# Patient Record
Sex: FEMALE | Race: WHITE
[De-identification: names, ages, dates, MRNs, and addresses within clinical notes are randomized per-mention and may not be internally consistent; named-entity substitution may affect disease eponyms.]

---

## 2017-04-27 ENCOUNTER — HOSPITAL ENCOUNTER (OUTPATIENT)
Dept: HOSPITAL 20 - CCU | Age: 77
Setting detail: OBSERVATION
LOS: 1 days | End: 2017-04-28
Admitting: INTERNAL MEDICINE
Payer: MEDICARE

## 2017-04-27 DIAGNOSIS — I12.9: ICD-10-CM

## 2017-04-27 DIAGNOSIS — Z96.641: ICD-10-CM

## 2017-04-27 DIAGNOSIS — Z98.890: ICD-10-CM

## 2017-04-27 DIAGNOSIS — Z89.422: ICD-10-CM

## 2017-04-27 DIAGNOSIS — Z90.89: ICD-10-CM

## 2017-04-27 DIAGNOSIS — Z79.01: ICD-10-CM

## 2017-04-27 DIAGNOSIS — Z88.5: ICD-10-CM

## 2017-04-27 DIAGNOSIS — K21.9: ICD-10-CM

## 2017-04-27 DIAGNOSIS — Z79.82: ICD-10-CM

## 2017-04-27 DIAGNOSIS — N18.9: ICD-10-CM

## 2017-04-27 DIAGNOSIS — R13.10: ICD-10-CM

## 2017-04-27 DIAGNOSIS — Z79.899: ICD-10-CM

## 2017-04-27 DIAGNOSIS — Z88.1: ICD-10-CM

## 2017-04-27 DIAGNOSIS — I25.10: ICD-10-CM

## 2017-04-27 DIAGNOSIS — E87.6: Primary | ICD-10-CM

## 2017-04-27 DIAGNOSIS — I73.00: ICD-10-CM

## 2017-04-27 DIAGNOSIS — Z89.512: ICD-10-CM

## 2017-04-27 DIAGNOSIS — R19.7: ICD-10-CM

## 2017-04-27 DIAGNOSIS — M34.1: ICD-10-CM

## 2017-04-27 DIAGNOSIS — Z91.048: ICD-10-CM

## 2017-04-27 DIAGNOSIS — N17.9: ICD-10-CM

## 2017-04-27 LAB
INR: 1.1 INR (ref 0.9–1.1)
PROTHROMBIN TIME: 12.4 SECONDS (ref 9–13.6)

## 2017-04-27 PROCEDURE — G0379 DIRECT REFER HOSPITAL OBSERV: HCPCS

## 2017-04-27 PROCEDURE — G0378 HOSPITAL OBSERVATION PER HR: HCPCS

## 2017-04-28 LAB
ALB/GLOB RATIO: 1 (ref 0.8–2)
ALBUMIN: 2.4 G/DL (ref 3.5–5)
ALKALINE PHOSPHATASE: 55 U/L (ref 33–138)
ALT/SGPT: 15 U/L (ref 12–78)
ANION GAP: 11 MMOL/L (ref 0–20)
AST/SGOT: 17 U/L (ref 10–40)
BASO %: 0.3 % (ref 0–2)
BASO ABSOLUTE COUNT: 0 THO/CMM (ref 0–0.2)
BILIRUBIN,TOTAL: 0.2 MG/DL (ref 0–1.5)
BLOOD UREA NITROGEN: 21 MG/DL (ref 6–24)
BUN/CREATININE RATIO: 16 RATIO (ref 6–25)
CALCIUM: 7.5 MG/DL (ref 8.5–10.5)
CARBON DIOXIDE-VENOUS: 23 MMOL/L (ref 22–32)
CHLORIDE: 114 MMOL/L (ref 96–110)
CREATININE: 1.32 MG/DL (ref 0.5–1.1)
EGFR VALUE FOR BLACK: 45 ML/MIN
EOS %: 0.7 % (ref 0–7)
EOSINOPHIL ABSOLUTE COUNT: 0.1 THO/CMM (ref 0–0.7)
GLOBULIN: 2.3 GM/DL (ref 2–4.4)
GLUCOSE: 110 MG/DL (ref 70–110)
HCT-HEMATOCRIT: 28.9 % (ref 34–49)
HGB-HEMOGLOBIN: 9.4 GM/DL (ref 12–15.5)
IMMATURE GRANULOCYTES ABSOLUTE: 0.01 THO/CMM (ref 0–0.03)
IMMATURE GRANULOCYTES PERCENT: 0.1 % (ref 0–0.3)
LYMPH %: 17.5 % (ref 20–45)
LYMPH ABSOLUTE COUNT: 1.2 THO/CMM (ref 0.8–4.5)
MAGNESIUM: 1.5 MG/DL (ref 1.8–2.6)
MCH (MEAN CORPUSCULAR HGB): 29.6 PG (ref 28–32)
MCHC MEAN CORPUSCULAR HGB CONC: 32.5 % (ref 32–36)
MCV (MEAN CELL VOLUME): 90.9 FL (ref 82–96)
MEAN PLATELET VOLUME: 10.3 CMC (ref 9.4–12.4)
MONO %: 9.8 % (ref 0–12)
MONOCYTE ABSOLUTE COUNT: 0.7 THO/CMM (ref 0–1.2)
NEUTROPHIL ABSOLUTE COUNT: 5 THO/CMM (ref 1.6–8)
NEUTROPHIL-AUTOMATED: 5 THO/CMM (ref 1.6–8)
NEUTROPHILS %: 71.6 % (ref 40–80)
NUCLEATED RED BLOOD CELL %: 0 % (ref 0–0.09)
PHOSPHOROUS: 1.5 MG/DL (ref 2.5–4.9)
PLATELET COUNT: 116 THO/CMM (ref 150–450)
POTASSIUM: 3.4 MMOL/L (ref 3.7–5.1)
RED BLOOD COUNT: 3.18 MIL/CMM (ref 4–5.2)
RED CELL DISTRIBUTION WIDTH: 14.1 % (ref 12.4–16.4)
SODIUM: 145 MMOL/L (ref 135–145)
WHITE BLOOD COUNT: 6.9 THO/CMM (ref 4–10)

## 2017-05-25 ENCOUNTER — HOSPITAL ENCOUNTER (INPATIENT)
Dept: HOSPITAL 20 - SRG | Age: 77
LOS: 7 days | Discharge: HOME HEALTH | DRG: 264 | End: 2017-06-01
Admitting: INTERNAL MEDICINE
Payer: MEDICARE

## 2017-05-25 DIAGNOSIS — I50.32: ICD-10-CM

## 2017-05-25 DIAGNOSIS — M19.90: ICD-10-CM

## 2017-05-25 DIAGNOSIS — F32.9: ICD-10-CM

## 2017-05-25 DIAGNOSIS — I73.00: ICD-10-CM

## 2017-05-25 DIAGNOSIS — I25.10: ICD-10-CM

## 2017-05-25 DIAGNOSIS — E43: ICD-10-CM

## 2017-05-25 DIAGNOSIS — M34.9: ICD-10-CM

## 2017-05-25 DIAGNOSIS — L97.519: ICD-10-CM

## 2017-05-25 DIAGNOSIS — I48.2: ICD-10-CM

## 2017-05-25 DIAGNOSIS — M81.0: ICD-10-CM

## 2017-05-25 DIAGNOSIS — Z79.82: ICD-10-CM

## 2017-05-25 DIAGNOSIS — R00.0: ICD-10-CM

## 2017-05-25 DIAGNOSIS — N17.9: ICD-10-CM

## 2017-05-25 DIAGNOSIS — Z88.1: ICD-10-CM

## 2017-05-25 DIAGNOSIS — K22.4: ICD-10-CM

## 2017-05-25 DIAGNOSIS — N18.4: ICD-10-CM

## 2017-05-25 DIAGNOSIS — I34.0: ICD-10-CM

## 2017-05-25 DIAGNOSIS — I73.9: ICD-10-CM

## 2017-05-25 DIAGNOSIS — E78.5: ICD-10-CM

## 2017-05-25 DIAGNOSIS — R62.7: ICD-10-CM

## 2017-05-25 DIAGNOSIS — J96.01: ICD-10-CM

## 2017-05-25 DIAGNOSIS — I31.3: ICD-10-CM

## 2017-05-25 DIAGNOSIS — I13.0: Primary | ICD-10-CM

## 2017-05-25 DIAGNOSIS — I95.1: ICD-10-CM

## 2017-05-25 DIAGNOSIS — M34.1: ICD-10-CM

## 2017-05-25 DIAGNOSIS — M26.601: ICD-10-CM

## 2017-05-25 DIAGNOSIS — E86.9: ICD-10-CM

## 2017-05-25 DIAGNOSIS — G47.33: ICD-10-CM

## 2017-05-25 DIAGNOSIS — Z88.8: ICD-10-CM

## 2017-05-25 DIAGNOSIS — Z79.01: ICD-10-CM

## 2017-05-25 DIAGNOSIS — I27.2: ICD-10-CM

## 2017-05-25 DIAGNOSIS — D63.1: ICD-10-CM

## 2017-05-25 LAB
ANION GAP: 13 MMOL/L (ref 0–20)
BLOOD UREA NITROGEN: 32 MG/DL (ref 6–24)
BUN/CREATININE RATIO: 16 RATIO (ref 6–25)
CALCIUM: 7.7 MG/DL (ref 8.5–10.5)
CARBON DIOXIDE-VENOUS: 27 MMOL/L (ref 22–32)
CHLORIDE: 107 MMOL/L (ref 96–110)
CREATININE: 2 MG/DL (ref 0.5–1.1)
EGFR VALUE FOR BLACK: 27 ML/MIN
GLUCOSE: 96 MG/DL (ref 70–110)
INR: 1 INR (ref 0.9–1.1)
POTASSIUM: 4.6 MMOL/L (ref 3.7–5.1)
PROTHROMBIN TIME: 11.3 SECONDS (ref 9–13.6)
SODIUM: 142 MMOL/L (ref 135–145)

## 2017-05-25 PROCEDURE — 0DNT4ZZ: ICD-10-PCS | Performed by: SURGERY

## 2017-05-25 PROCEDURE — C1769 GUIDE WIRE: HCPCS

## 2017-05-25 PROCEDURE — 0D160ZA BYPASS STOMACH TO JEJUNUM, OPEN APPROACH: ICD-10-PCS | Performed by: SURGERY

## 2017-05-25 PROCEDURE — C1894 INTRO/SHEATH, NON-LASER: HCPCS

## 2017-05-25 PROCEDURE — C8924 2D TTE W OR W/O FOL W/CON,FU: HCPCS

## 2017-05-25 PROCEDURE — 0D1A0Z4 BYPASS JEJUNUM TO CUTANEOUS, OPEN APPROACH: ICD-10-PCS | Performed by: SURGERY

## 2017-05-25 NOTE — NUR
VIRTUAL CARE NOTE: PT. IN BED, STATES SHE HAS OCCASIONAL NAUSEA, ALTHOUGH
STATES SHE DOESN'T FEEL GOOD AND IS NEEDING SOME PAIN MEDS. WILL PAGE RN ON
UNIT AND ALLOW FOR REST PERIOD. INSTRUCTED TO PT. TO CALL FOR FUTURE NEEDS AND
NOT TO EAT OR DRINK UNTIL NAUSEA SUBSIDES. STATES VERBAL UNDERSTANDING. RN ON
UNIT PAGED.

## 2017-05-26 LAB
ANION GAP: 15 MMOL/L (ref 0–20)
BASO %: 0.1 % (ref 0–2)
BASO ABSOLUTE COUNT: 0 THO/CMM (ref 0–0.2)
BLOOD UREA NITROGEN: 31 MG/DL (ref 6–24)
BUN/CREATININE RATIO: 16 RATIO (ref 6–25)
CALCIUM: 7.5 MG/DL (ref 8.5–10.5)
CARBON DIOXIDE-VENOUS: 24 MMOL/L (ref 22–32)
CHLORIDE: 107 MMOL/L (ref 96–110)
CREATININE: 1.9 MG/DL (ref 0.5–1.1)
EGFR VALUE FOR BLACK: 29 ML/MIN
EOS %: 0 % (ref 0–7)
EOSINOPHIL ABSOLUTE COUNT: 0 THO/CMM (ref 0–0.7)
GLUCOSE: 160 MG/DL (ref 70–110)
HCT-HEMATOCRIT: 28.5 % (ref 34–49)
HGB-HEMOGLOBIN: 9.2 GM/DL (ref 12–15.5)
IMMATURE GRANULOCYTES ABSOLUTE: 0.03 THO/CMM (ref 0–0.03)
IMMATURE GRANULOCYTES PERCENT: 0.2 % (ref 0–0.3)
LYMPH %: 6.1 % (ref 20–45)
LYMPH ABSOLUTE COUNT: 0.8 THO/CMM (ref 0.8–4.5)
MAGNESIUM: 1.7 MG/DL (ref 1.8–2.6)
MCH (MEAN CORPUSCULAR HGB): 30.1 PG (ref 28–32)
MCHC MEAN CORPUSCULAR HGB CONC: 32.3 % (ref 32–36)
MCV (MEAN CELL VOLUME): 93.1 FL (ref 82–96)
MEAN PLATELET VOLUME: 10.5 CMC (ref 9.4–12.4)
MONO %: 4.2 % (ref 0–12)
MONOCYTE ABSOLUTE COUNT: 0.5 THO/CMM (ref 0–1.2)
NEUTROPHIL ABSOLUTE COUNT: 11.5 THO/CMM (ref 1.6–8)
NEUTROPHIL-AUTOMATED: 11.5 THO/CMM (ref 1.6–8)
NEUTROPHILS %: 89.4 % (ref 40–80)
NUCLEATED RED BLOOD CELL %: 0 % (ref 0–0.09)
PHOSPHOROUS: 3.7 MG/DL (ref 2.5–4.9)
PLATELET COUNT: 124 THO/CMM (ref 150–450)
POTASSIUM: 4.5 MMOL/L (ref 3.7–5.1)
RED BLOOD COUNT: 3.06 MIL/CMM (ref 4–5.2)
RED CELL DISTRIBUTION WIDTH: 14.6 % (ref 12.4–16.4)
SODIUM: 141 MMOL/L (ref 135–145)
WHITE BLOOD COUNT: 12.9 THO/CMM (ref 4–10)

## 2017-05-26 NOTE — NUR
VIRTUAL CARE NOTE: ASSESSMENT DEFERRED. PT EITHER WITH AIDE OR SLEEPING. WILL
CONTINUE WITH CHART REVIEW.

## 2017-05-26 NOTE — NUR
virtual care note: was going to call in to visit with pt but notice that she
is sleeping. will not disturb patient. will continue to monitor. electronic
chart reviewed.

## 2017-05-27 LAB
ANION GAP: 12 MMOL/L (ref 0–20)
BLOOD UREA NITROGEN: 28 MG/DL (ref 6–24)
BUN/CREATININE RATIO: 20 RATIO (ref 6–25)
CALCIUM: 7.3 MG/DL (ref 8.5–10.5)
CARBON DIOXIDE-VENOUS: 26 MMOL/L (ref 22–32)
CHLORIDE: 106 MMOL/L (ref 96–110)
CREATININE: 1.35 MG/DL (ref 0.5–1.1)
EGFR VALUE FOR BLACK: 43 ML/MIN
GLUCOSE: 196 MG/DL (ref 70–110)
MAGNESIUM: 1.6 MG/DL (ref 1.8–2.6)
PHOSPHOROUS: 1.6 MG/DL (ref 2.5–4.9)
POTASSIUM: 4 MMOL/L (ref 3.7–5.1)
SODIUM: 140 MMOL/L (ref 135–145)

## 2017-05-27 NOTE — NUR
VIRTUAL CARE NOTE: CHECKED IN ON PT AT THIS TIME. SHE IS RESTING IN BED.
STATES SHE HAS BEEN EXPERIENCING SOME NAUSEA AND HAS HAD A FEW LOOSE STOOLS
OVERNIGHT. REQUESTING IMMODIUM AND ZOFRAN--RELAYED THESE REQUESTS TO HER
BEDSIDE NURSE, MIN LOPEZ. PT STATES SHE IS SATISFIED WITH HER CARE. ALSO
STATES SHE HAS BEEN UP FOR A WALK TODAY, THOUGH SHE DOES FEEL VERY WEAK. WILL
CONTINUE TO MONITOR. ELECTRONIC CHART REVIEWED.

## 2017-05-28 LAB
ANION GAP: 11 MMOL/L (ref 0–20)
BLOOD UREA NITROGEN: 26 MG/DL (ref 6–24)
BUN/CREATININE RATIO: 21 RATIO (ref 6–25)
CALCIUM: 7.2 MG/DL (ref 8.5–10.5)
CARBON DIOXIDE-VENOUS: 25 MMOL/L (ref 22–32)
CHLORIDE: 106 MMOL/L (ref 96–110)
CREATININE: 1.16 MG/DL (ref 0.5–1.1)
EGFR VALUE FOR BLACK: 52 ML/MIN
GLUCOSE: 160 MG/DL (ref 70–110)
HCT-HEMATOCRIT: 27.9 % (ref 34–49)
HGB-HEMOGLOBIN: 8.8 GM/DL (ref 12–15.5)
INR: 0.9 INR (ref 0.9–1.1)
MCV (MEAN CELL VOLUME): 95.5 FL (ref 82–96)
POTASSIUM: 4.3 MMOL/L (ref 3.7–5.1)
PROTHROMBIN TIME: 10.9 SECONDS (ref 9–13.6)
RED CELL DISTRIBUTION WIDTH: 14.5 % (ref 12.4–16.4)
SODIUM: 138 MMOL/L (ref 135–145)

## 2017-05-28 NOTE — NUR
VN NOTE-PER BEDSIDE NURSE MIN HERMAN WAS ALSO NOTIFIED ON THE DDIMER AND
PROBNP RESULTS. DR CRAIN WAS ROUNDING AND SAW THE RESULTS AND ORDERS A CT
ANGIO.

## 2017-05-28 NOTE — NUR
VN/LEADER ROUNDING-VISITED WITH PATIENT AS SHE LAY IN BED-STATES PAIN IS STILL
THERE BUT CONTROLLED WITH PATITO' CARE(THE RN). PATIENT STATES THE STOOLS
HAVE SLOWED DOWN SOME WITH THE TF RATE BEING TURNED DOWN. PATIENT HAS NO
FURTHER QUESTIONS OR CONCERNS OTHER THAN BEING TIRED AND SLEEPY AT THIS TIME.

## 2017-05-29 LAB
ALB/GLOB RATIO: 0.8 (ref 0.8–2)
ALBUMIN: 1.8 G/DL (ref 3.5–5)
ALKALINE PHOSPHATASE: 49 U/L (ref 33–138)
ALT/SGPT: 9 U/L (ref 12–78)
ANION GAP: 10 MMOL/L (ref 0–20)
AST/SGOT: 12 U/L (ref 10–40)
BASO %: 0 % (ref 0–2)
BASO ABSOLUTE COUNT: 0 THO/CMM (ref 0–0.2)
BILIRUBIN,TOTAL: 0.3 MG/DL (ref 0–1.5)
BLOOD UREA NITROGEN: 28 MG/DL (ref 6–24)
BUN/CREATININE RATIO: 23 RATIO (ref 6–25)
CALCIUM: 7.4 MG/DL (ref 8.5–10.5)
CARBON DIOXIDE-VENOUS: 28 MMOL/L (ref 22–32)
CHLORIDE: 107 MMOL/L (ref 96–110)
CREATININE: 1.18 MG/DL (ref 0.5–1.1)
EGFR VALUE FOR BLACK: 51 ML/MIN
EOS %: 0.4 % (ref 0–7)
EOSINOPHIL ABSOLUTE COUNT: 0 THO/CMM (ref 0–0.7)
GLOBULIN: 2.1 GM/DL (ref 2–4.4)
GLUCOSE: 79 MG/DL (ref 70–110)
HCT-HEMATOCRIT: 24.1 % (ref 34–49)
HGB-HEMOGLOBIN: 7.6 GM/DL (ref 12–15.5)
IMMATURE GRANULOCYTES ABSOLUTE: 0.05 THO/CMM (ref 0–0.03)
IMMATURE GRANULOCYTES PERCENT: 1 % (ref 0–0.3)
INR: 1.1 INR (ref 0.9–1.1)
LYMPH %: 12.8 % (ref 20–45)
LYMPH ABSOLUTE COUNT: 0.7 THO/CMM (ref 0.8–4.5)
MAGNESIUM: 1.8 MG/DL (ref 1.8–2.6)
MCH (MEAN CORPUSCULAR HGB): 29.9 PG (ref 28–32)
MCHC MEAN CORPUSCULAR HGB CONC: 31.5 % (ref 32–36)
MCV (MEAN CELL VOLUME): 94.9 FL (ref 82–96)
MEAN PLATELET VOLUME: 10.1 CMC (ref 9.4–12.4)
MONO %: 8.8 % (ref 0–12)
MONOCYTE ABSOLUTE COUNT: 0.5 THO/CMM (ref 0–1.2)
NEUTROPHIL ABSOLUTE COUNT: 4 THO/CMM (ref 1.6–8)
NEUTROPHIL-AUTOMATED: 4 THO/CMM (ref 1.6–8)
NEUTROPHILS %: 77 % (ref 40–80)
NUCLEATED RED BLOOD CELL %: 0 % (ref 0–0.09)
PHOSPHOROUS: 2.7 MG/DL (ref 2.5–4.9)
PLATELET COUNT: 155 THO/CMM (ref 150–450)
POTASSIUM: 4.7 MMOL/L (ref 3.7–5.1)
PROTHROMBIN TIME: 12.3 SECONDS (ref 9–13.6)
RBC MORPHOLOGY: (no result)
RED BLOOD COUNT: 2.54 MIL/CMM (ref 4–5.2)
RED CELL DISTRIBUTION WIDTH: 14.7 % (ref 12.4–16.4)
SODIUM: 140 MMOL/L (ref 135–145)
WHITE BLOOD COUNT: 5.2 THO/CMM (ref 4–10)

## 2017-05-30 LAB
ANION GAP: 14 MMOL/L (ref 0–20)
BLOOD UREA NITROGEN: 30 MG/DL (ref 6–24)
BUN/CREATININE RATIO: 23 RATIO (ref 6–25)
CALCIUM: 7.4 MG/DL (ref 8.5–10.5)
CARBON DIOXIDE-VENOUS: 26 MMOL/L (ref 22–32)
CHLORIDE: 104 MMOL/L (ref 96–110)
CREATININE: 1.33 MG/DL (ref 0.5–1.1)
EGFR VALUE FOR BLACK: 44 ML/MIN
GLUCOSE: 69 MG/DL (ref 70–110)
INR: 1.1 INR (ref 0.9–1.1)
MAGNESIUM: 1.8 MG/DL (ref 1.8–2.6)
POTASSIUM: 4.2 MMOL/L (ref 3.7–5.1)
PROTHROMBIN TIME: 13.2 SECONDS (ref 9–13.6)
SODIUM: 140 MMOL/L (ref 135–145)

## 2017-05-30 NOTE — NUR
VIRTUAL CARE NOTE: PT RESTING ON BED, STATES DOING OK NOT EATING MUCH YET DUE
TO POOR APPETITE, THE PLAN IS TO RESTART TF TODAY, PT OK FOR DIETICIAN VISIT
REGARDING DIET CONCERNS. PLAN OF CARE AND DISCHARGE PLAN DISCUSSED, WILL DC
WITH C AND HOME INFUSION. PT DENIES ANY NEEDS AT THIS TIME.

## 2017-05-31 LAB
ANION GAP: 12 MMOL/L (ref 0–20)
BASO %: 0.2 % (ref 0–2)
BASO ABSOLUTE COUNT: 0 THO/CMM (ref 0–0.2)
BLOOD UREA NITROGEN: 25 MG/DL (ref 6–24)
BUN/CREATININE RATIO: 17 RATIO (ref 6–25)
CALCIUM: 7.4 MG/DL (ref 8.5–10.5)
CARBON DIOXIDE-VENOUS: 30 MMOL/L (ref 22–32)
CHLORIDE: 102 MMOL/L (ref 96–110)
CREATININE: 1.39 MG/DL (ref 0.5–1.1)
EGFR VALUE FOR BLACK: 42 ML/MIN
EOS %: 0.2 % (ref 0–7)
EOSINOPHIL ABSOLUTE COUNT: 0 THO/CMM (ref 0–0.7)
GLUCOSE: 118 MG/DL (ref 70–110)
HCT-HEMATOCRIT: 26.7 % (ref 34–49)
HGB-HEMOGLOBIN: 8.7 GM/DL (ref 12–15.5)
IMMATURE GRANULOCYTES ABSOLUTE: 0.08 THO/CMM (ref 0–0.03)
IMMATURE GRANULOCYTES PERCENT: 1.3 % (ref 0–0.3)
INR: 1.1 INR (ref 0.9–1.1)
LYMPH %: 14.9 % (ref 20–45)
LYMPH ABSOLUTE COUNT: 0.9 THO/CMM (ref 0.8–4.5)
MCH (MEAN CORPUSCULAR HGB): 30.2 PG (ref 28–32)
MCHC MEAN CORPUSCULAR HGB CONC: 32.6 % (ref 32–36)
MCV (MEAN CELL VOLUME): 92.7 FL (ref 82–96)
MEAN PLATELET VOLUME: 9.8 CMC (ref 9.4–12.4)
MONO %: 7.2 % (ref 0–12)
MONOCYTE ABSOLUTE COUNT: 0.5 THO/CMM (ref 0–1.2)
NEUTROPHIL ABSOLUTE COUNT: 4.8 THO/CMM (ref 1.6–8)
NEUTROPHIL-AUTOMATED: 4.8 THO/CMM (ref 1.6–8)
NEUTROPHILS %: 76.2 % (ref 40–80)
NUCLEATED RED BLOOD CELL %: 0 % (ref 0–0.09)
PLATELET COUNT: 174 THO/CMM (ref 150–450)
POTASSIUM: 3.8 MMOL/L (ref 3.7–5.1)
PROTHROMBIN TIME: 13.2 SECONDS (ref 9–13.6)
RED BLOOD COUNT: 2.88 MIL/CMM (ref 4–5.2)
RED CELL DISTRIBUTION WIDTH: 14.2 % (ref 12.4–16.4)
SODIUM: 140 MMOL/L (ref 135–145)
WHITE BLOOD COUNT: 6.3 THO/CMM (ref 4–10)

## 2017-06-01 LAB
ANION GAP: 10 MMOL/L (ref 0–20)
BLOOD UREA NITROGEN: 22 MG/DL (ref 6–24)
BUN/CREATININE RATIO: 15 RATIO (ref 6–25)
CALCIUM: 7.1 MG/DL (ref 8.5–10.5)
CARBON DIOXIDE-VENOUS: 31 MMOL/L (ref 22–32)
CHLORIDE: 101 MMOL/L (ref 96–110)
CREATININE: 1.39 MG/DL (ref 0.5–1.1)
EGFR VALUE FOR BLACK: 42 ML/MIN
GLUCOSE: 134 MG/DL (ref 70–110)
INR: 1.1 INR (ref 0.9–1.1)
POTASSIUM: 3.8 MMOL/L (ref 3.7–5.1)
PROTHROMBIN TIME: 12.6 SECONDS (ref 9–13.6)
SODIUM: 138 MMOL/L (ref 135–145)

## 2017-06-01 NOTE — NUR
VIRTUAL NURSE NOTE: PT CAN'T HEAR VN WELL, DISCHARGE TEACHING DONE AT BEDSIDE.
INFORMATION GIVEN TO PT, ALL QUESTONS ANSWERED. VN CALLED PHARMACY SPOKE TO
[facility]ILIP PHARMACIST TO GO THROUGH THE MED LIST, PT WANTS TO KNOW WHAT MED CAN BE
CRUSHED AND WHAT CAN'T. INFORMATION WRITTEN DOWN ON PT'S MED DISCCHARGE LIST.
PT WILL DC HOME WITH J-TUBE FOR TF AND [facility] WILL ASSIST WITH CARES AT HOME.
PT DENIES FURTHER NEEDS, QUESTIONS, OR CONCERNS. INFORMED FLOOR NURSE
DISCHARGE TEACHING DONE.

## 2017-06-01 NOTE — NUR
VIRTUAL NURSE NOTE: PT RESTING ON BED, STATES DOING OK TODAY, TOLERATES TUBE
FEEING WELL SO FAR, HAS ORDER TO DC HOME TODAY. DISCHARGE PLAN DISCUSSED, PT
WILL HAVE [facility] AND HOME INFUSION AT DISCHARGE. AWAITING FOR MORE MD TO SIGN
OFF FOR DISCHARGE. VN COTACTED DISCHARGE PLANNER AND HOME HEALTH CORDINATOR
FOR THE PLAN OF DISCHARGE TODAY.

## 2018-04-13 ENCOUNTER — HOSPITAL ENCOUNTER (EMERGENCY)
Dept: HOSPITAL 89 - ER | Age: 78
Discharge: HOME | End: 2018-04-13
Payer: MEDICARE

## 2018-04-13 VITALS — DIASTOLIC BLOOD PRESSURE: 63 MMHG | SYSTOLIC BLOOD PRESSURE: 121 MMHG

## 2018-04-13 DIAGNOSIS — K94.23: Primary | ICD-10-CM

## 2018-04-13 PROCEDURE — 99283 EMERGENCY DEPT VISIT LOW MDM: CPT

## 2018-04-13 NOTE — ER REPORT
History and Physical


Time Seen By MD:  10:00


Hx. of Stated Complaint:  


patient has a j/g tube that was dislodged last night. patient is requesting 

that 


we put something in the hole to keep it from closing


HPI/ROS


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 She has a G/ J tube. She is 

from [facility] and was about to leave for a trip to [place] last 

night when her to became dislodged at home back in [place]. She made the 

decision to leave the tube out, and started her trip towards [place] 

with her daughter. Not only did she have copious amounts of gastric contents 

spilling outside her abdomen, she was instructed by her surgeon this morning to 

stop at the nearest emergency department to have either a new tube or a Snyder 

catheter placed at the site. She is set to have a revision of the area next 

week when she returns from her trip to [place]. She does still take some 

by mouth but the tube was placed for additional nutrition given that her weight 

got below 85 pounds.


Allergies:  


Coded Allergies:  


     piperacillin (Verified  Allergy, Severe, 4/13/18)


     tazobactam (Verified  Allergy, Severe, 4/13/18)


     codeine (Verified  Allergy, Intermediate, 4/13/18)


     povidone-iodine (Verified  Allergy, Intermediate, 4/13/18)


     soap (Verified  Allergy, Intermediate, 4/13/18)


Home Meds


Reported Medications


Mirtazapine (MIRTAZAPINE) 15 Mg Tablet, 15 MG PO QHS


   4/13/18


Gabapentin (GABAPENTIN) 300 Mg Capsule, 600 MG PO Q6H, CAPSULE


   4/13/18


Pantoprazole Sodium (PANTOPRAZOLE SODIUM) 40 Mg Tablet.dr, 40 MG PO BID, TAB.SR


   4/13/18


Aspirin (ASPIR 81) 81 Mg Tablet.dr, 81 MG PO QDAY, TAB


   4/13/18


Pentoxifylline (PENTOXIFYLLINE) 400 Mg Tabsr, 400 MG PO TID


   4/13/18


Ferrous Sulfate (FERROUS SULFATE) 325 Mg Tablet, 325 MG PO BID


   4/13/18


Simvastatin (SIMVASTATIN) 20 Mg Tablet, 20 MG PO HS, TAB


   4/13/18


Magnesium Oxide (MAGNESIUM OXIDE) 400 Mg Tablet, 400 MG PO TID


   4/13/18


Cilostazol (CILOSTAZOL) 100 Mg Tablet, 100 MG PO QHS


   4/13/18


Folic Acid (FOLIC ACID) 1 Mg Tablet, 1 MG PO QDAY, TAB


   4/13/18


Hydroxychloroquine Sulfate (HYDROXYCHLOROQUINE SULFATE) 200 Mg Tablet, 200 MG 

PO QDAY


   4/13/18


Bisoprolol Fumarate (BISOPROLOL FUMARATE) 5 Mg Tablet, 5 MG PO QDAY, #10 TAB


   4/13/18


Cholecalciferol (Vitamin D3) (VITAMIN D3) 1,000 Unit Tablet, 1000 UNIT PO QDAY, 

TAB


   4/13/18


Spironolactone (SPIRONOLACTONE) 25 Mg Tablet, 25 MG PO QDAY, TAB


   4/13/18


Digoxin (DIGOXIN) 125 Mcg Tablet, 125 MCG PO QDAY


   4/13/18


Reviewed Nurses Notes:  Yes


Hx Smoking:  Yes


Smoking Status:  Former Smoker


Exposure to Second Hand Smoke?:  No


Hx Substance Use Disorder:  No


Hx Alcohol Use:  No


Constitutional





Vital Sign - Last 24 Hours








 4/13/18 4/13/18





 10:08 11:15


 


Temp 98.4 


 


Pulse 72 95


 


Resp 20 


 


B/P (MAP) 131/75 121/63 (82)


 


Pulse Ox  96


 


O2 Delivery  Room Air








Physical Exam


  General Appearance: The patient is alert, has no immediate need for airway 

protection and no current signs of toxicity.  


Eyes: Pupils equal and round no injection.


Respiratory: Chest is non tender, lungs are clear to auscultation.


Cardiac: regular rate and rhythm 


Gastrointestinal: Abdomen is soft and non tender, no masses, bowel sounds 

normal. There is a stoma where the G/J tube used to be. Stomach contents is 

expelling from the site. The surrounding skin is erythematous and irritated. 

There is a small hole (.5mm) at the bottom of the stoma where stomach contents 

is also leaking. 


Skin: No rashes or lesions.





DIFFERENTIAL DIAGNOSIS: After history and physical exam differential diagnosis 

was considered for cellulitis, tube displacement, infection





Medical Decision Making


ED Course/Re-evaluation


ED Course


This is a very pleasant 77-year-old female who is currently in route from 

[facility] to [place]. Her G/J tube fell out approximately 12 

hours ago while she was at home. She did not bring the tube with her to the 

emergency department. Her plan was to just leave the tube out considering she 

is set for a revision of the site in one week when she returns from her trip. 

However when she called her surgeon this morning they told her to come to the 

emergency department to have a Snyder catheter placed in the site. I spoke with 

Sebastian is the nurse at the surgeon's office. I explained to Dusty that not only 

was the tube out of place but there is also stable at the base of the stoma her 

stomach contents were leaking. She is aware of the small at the bottom of the 

stoma, and said the plan was to have a revision done more immediately but the 

patient wanted to go on her trip. I told Dusty that I had placed a 16 [place]raini 

Snyder catheter at the site without complications. I offered to go ahead and 

place a suture at the other open site that was also leaking, and the nurse at 

the surgeon's office said that would be a great thing to do. I sutured the area 

after 5 ml of lidocaine with epi. I placed 2 interrupted 2-0 silk sutures and 

closed the defect that had been leaking the stomach contents. There was no 

futhre leaking. I cleaned the area, placed a barrier cream, and dressed the 

area with 4x4's. She will follow up with her surgeon next week as planned.


Decision to Disposition Date:  Apr 13, 2018


Decision to Disposition Time:  13:00





Depart


Departure


Latest Vital Signs





Vital Signs








  Date Time  Temp Pulse Resp B/P (MAP) Pulse Ox O2 Delivery O2 Flow Rate FiO2


 


4/13/18 11:15  95  121/63 (82) 96 Room Air  


 


4/13/18 10:08 98.4  20     








Impression:  


 Primary Impression:  


 Attention to G-tube


Condition:  Improved


Disposition:  HOME OR SELF-CARE


Departure Forms:  ER Transition Record, Medications Reconciliation,    Patient 

Portal Information





Additional Instructions:  


If the G tube becomes dislodged, try to replace and refill the balloon. Keep 

area clean and dry











SUREKHA CONROY MD Apr 13, 2018 11:27